# Patient Record
Sex: MALE | Race: WHITE | Employment: FULL TIME | URBAN - NONMETROPOLITAN AREA
[De-identification: names, ages, dates, MRNs, and addresses within clinical notes are randomized per-mention and may not be internally consistent; named-entity substitution may affect disease eponyms.]

---

## 2020-01-18 ENCOUNTER — OFFICE VISIT (OUTPATIENT)
Dept: PRIMARY CARE CLINIC | Age: 30
End: 2020-01-18
Payer: COMMERCIAL

## 2020-01-18 VITALS
BODY MASS INDEX: 31.55 KG/M2 | SYSTOLIC BLOOD PRESSURE: 138 MMHG | TEMPERATURE: 98.2 F | HEART RATE: 88 BPM | WEIGHT: 213 LBS | RESPIRATION RATE: 16 BRPM | OXYGEN SATURATION: 97 % | HEIGHT: 69 IN | DIASTOLIC BLOOD PRESSURE: 88 MMHG

## 2020-01-18 PROCEDURE — G8417 CALC BMI ABV UP PARAM F/U: HCPCS | Performed by: NURSE PRACTITIONER

## 2020-01-18 PROCEDURE — G8484 FLU IMMUNIZE NO ADMIN: HCPCS | Performed by: NURSE PRACTITIONER

## 2020-01-18 PROCEDURE — 1036F TOBACCO NON-USER: CPT | Performed by: NURSE PRACTITIONER

## 2020-01-18 PROCEDURE — 99213 OFFICE O/P EST LOW 20 MIN: CPT | Performed by: NURSE PRACTITIONER

## 2020-01-18 PROCEDURE — G8427 DOCREV CUR MEDS BY ELIG CLIN: HCPCS | Performed by: NURSE PRACTITIONER

## 2020-01-18 RX ORDER — AZITHROMYCIN 250 MG/1
TABLET, FILM COATED ORAL
Qty: 1 PACKET | Refills: 0 | Status: SHIPPED | OUTPATIENT
Start: 2020-01-18 | End: 2020-01-23

## 2020-01-18 RX ORDER — PREDNISONE 20 MG/1
20 TABLET ORAL 2 TIMES DAILY
Qty: 10 TABLET | Refills: 0 | Status: SHIPPED | OUTPATIENT
Start: 2020-01-18 | End: 2020-01-23

## 2020-01-18 RX ORDER — BENZONATATE 100 MG/1
100 CAPSULE ORAL 3 TIMES DAILY PRN
Qty: 30 CAPSULE | Refills: 1 | Status: SHIPPED | OUTPATIENT
Start: 2020-01-18 | End: 2020-09-27

## 2020-01-18 ASSESSMENT — ENCOUNTER SYMPTOMS
VOMITING: 0
WHEEZING: 0
SHORTNESS OF BREATH: 0
NAUSEA: 0
COUGH: 1
SINUS PRESSURE: 1
RHINORRHEA: 1
DIARRHEA: 0
SORE THROAT: 1

## 2020-01-18 NOTE — PROGRESS NOTES
Subjective:      Patient ID: Anne-Marie Hebert is a 30 y.o. male. Cough   This is a new problem. The current episode started in the past 7 days. The problem has been unchanged. The problem occurs every few minutes. The cough is productive of sputum. Associated symptoms include ear pain, nasal congestion, postnasal drip, rhinorrhea and a sore throat. Pertinent negatives include no chest pain, fever, shortness of breath or wheezing. Nothing aggravates the symptoms. Treatments tried: cough drops, nasal spray. The treatment provided mild relief. Past Medical History:   Diagnosis Date    Bilateral knee pain     Corneal abrasion, left     history of     Myofasciitis     bilateral     Patellofemoral dysfunction     bilateral     Pes planus     Plantar fasciitis, bilateral        History reviewed. No pertinent surgical history.     Social History     Socioeconomic History    Marital status: Single     Spouse name: None    Number of children: None    Years of education: None    Highest education level: None   Occupational History    None   Social Needs    Financial resource strain: None    Food insecurity:     Worry: None     Inability: None    Transportation needs:     Medical: None     Non-medical: None   Tobacco Use    Smoking status: Never Smoker    Smokeless tobacco: Never Used   Substance and Sexual Activity    Alcohol use: No    Drug use: No    Sexual activity: None   Lifestyle    Physical activity:     Days per week: None     Minutes per session: None    Stress: None   Relationships    Social connections:     Talks on phone: None     Gets together: None     Attends Denominational service: None     Active member of club or organization: None     Attends meetings of clubs or organizations: None     Relationship status: None    Intimate partner violence:     Fear of current or ex partner: None     Emotionally abused: None     Physically abused: None     Forced sexual activity: None   Other symptoms do not improve or worsen   Return PRN         Melanie Lindsay, APRN - CNP

## 2020-09-27 ENCOUNTER — HOSPITAL ENCOUNTER (OUTPATIENT)
Age: 30
Discharge: HOME | End: 2020-09-29
Payer: COMMERCIAL

## 2020-09-27 ENCOUNTER — HOSPITAL ENCOUNTER (OUTPATIENT)
Dept: GENERAL RADIOLOGY | Age: 30
Discharge: HOME | End: 2020-09-29
Payer: COMMERCIAL

## 2020-09-27 ENCOUNTER — HOSPITAL ENCOUNTER (OUTPATIENT)
Age: 30
Setting detail: SPECIMEN
Discharge: HOME | End: 2020-09-27
Payer: COMMERCIAL

## 2020-09-27 ENCOUNTER — OFFICE VISIT (OUTPATIENT)
Dept: PRIMARY CARE CLINIC | Age: 30
End: 2020-09-27
Payer: COMMERCIAL

## 2020-09-27 VITALS
OXYGEN SATURATION: 97 % | RESPIRATION RATE: 16 BRPM | DIASTOLIC BLOOD PRESSURE: 82 MMHG | WEIGHT: 200 LBS | SYSTOLIC BLOOD PRESSURE: 138 MMHG | TEMPERATURE: 98.4 F | HEART RATE: 77 BPM | HEIGHT: 69 IN | BODY MASS INDEX: 29.62 KG/M2

## 2020-09-27 LAB
ABSOLUTE EOS #: 0.15 K/UL (ref 0–0.44)
ABSOLUTE IMMATURE GRANULOCYTE: 0.03 K/UL (ref 0–0.3)
ABSOLUTE LYMPH #: 0.9 K/UL (ref 1.1–3.7)
ABSOLUTE MONO #: 0.83 K/UL (ref 0.1–1.2)
BASOPHILS # BLD: 0 % (ref 0–2)
BASOPHILS ABSOLUTE: <0.03 K/UL (ref 0–0.2)
DIFFERENTIAL TYPE: ABNORMAL
EOSINOPHILS RELATIVE PERCENT: 2 % (ref 1–4)
HCT VFR BLD CALC: 42.9 % (ref 40.7–50.3)
HEMOGLOBIN: 14.8 G/DL (ref 13–17)
IMMATURE GRANULOCYTES: 0 %
LYMPHOCYTES # BLD: 11 % (ref 24–43)
MCH RBC QN AUTO: 29.4 PG (ref 25.2–33.5)
MCHC RBC AUTO-ENTMCNC: 34.5 G/DL (ref 25.2–33.5)
MCV RBC AUTO: 85.3 FL (ref 82.6–102.9)
MONOCYTES # BLD: 10 % (ref 3–12)
NRBC AUTOMATED: 0 PER 100 WBC
PDW BLD-RTO: 12.8 % (ref 11.8–14.4)
PLATELET # BLD: 261 K/UL (ref 138–453)
PLATELET ESTIMATE: ABNORMAL
PMV BLD AUTO: 8.7 FL (ref 8.1–13.5)
RBC # BLD: 5.03 M/UL (ref 4.21–5.77)
RBC # BLD: ABNORMAL 10*6/UL
SEDIMENTATION RATE, ERYTHROCYTE: 17 MM (ref 0–15)
SEG NEUTROPHILS: 77 % (ref 36–65)
SEGMENTED NEUTROPHILS ABSOLUTE COUNT: 6.53 K/UL (ref 1.5–8.1)
URIC ACID: 7.6 MG/DL (ref 3.4–7)
WBC # BLD: 8.5 K/UL (ref 3.5–11.3)
WBC # BLD: ABNORMAL 10*3/UL

## 2020-09-27 PROCEDURE — 1036F TOBACCO NON-USER: CPT | Performed by: FAMILY MEDICINE

## 2020-09-27 PROCEDURE — 73630 X-RAY EXAM OF FOOT: CPT

## 2020-09-27 PROCEDURE — 86140 C-REACTIVE PROTEIN: CPT

## 2020-09-27 PROCEDURE — 85025 COMPLETE CBC W/AUTO DIFF WBC: CPT

## 2020-09-27 PROCEDURE — 36415 COLL VENOUS BLD VENIPUNCTURE: CPT

## 2020-09-27 PROCEDURE — G8427 DOCREV CUR MEDS BY ELIG CLIN: HCPCS | Performed by: FAMILY MEDICINE

## 2020-09-27 PROCEDURE — 85651 RBC SED RATE NONAUTOMATED: CPT

## 2020-09-27 PROCEDURE — 84550 ASSAY OF BLOOD/URIC ACID: CPT

## 2020-09-27 PROCEDURE — G8417 CALC BMI ABV UP PARAM F/U: HCPCS | Performed by: FAMILY MEDICINE

## 2020-09-27 PROCEDURE — 99214 OFFICE O/P EST MOD 30 MIN: CPT | Performed by: FAMILY MEDICINE

## 2020-09-27 RX ORDER — COLCHICINE 0.6 MG/1
TABLET ORAL
Qty: 3 TABLET | Refills: 3 | Status: SHIPPED | OUTPATIENT
Start: 2020-09-27 | End: 2021-12-18 | Stop reason: ALTCHOICE

## 2020-09-27 RX ORDER — ACETAMINOPHEN 325 MG/1
650 TABLET ORAL EVERY 6 HOURS PRN
COMMUNITY

## 2020-09-27 RX ORDER — IBUPROFEN 200 MG
600 TABLET ORAL EVERY 6 HOURS PRN
COMMUNITY

## 2020-09-27 ASSESSMENT — PATIENT HEALTH QUESTIONNAIRE - PHQ9
1. LITTLE INTEREST OR PLEASURE IN DOING THINGS: 0
2. FEELING DOWN, DEPRESSED OR HOPELESS: 0
SUM OF ALL RESPONSES TO PHQ QUESTIONS 1-9: 0
SUM OF ALL RESPONSES TO PHQ9 QUESTIONS 1 & 2: 0
SUM OF ALL RESPONSES TO PHQ QUESTIONS 1-9: 0

## 2020-09-27 NOTE — PROGRESS NOTES
[facility]             [address] [place] [address] OF [place], [address] [place] [address] Rd                        Telephone [phone]             Fax [phone]       Yuly Stark  1990  MRN:  [id number]  Date of visit:  9/27/2020     Subjective: Yuly Stark is a 26 y.o.  male who presents to [address] [place] [address] Urgent Care today (9/27/2020) for evaluation of:  Foot Swelling (Romeo feet pain/edema up to calf and knees at times x10 days. )      He states that he has had bilateral foot pain for about 10 days. He denies injury. He has been taking Tylenol and Ibuprofen for the pain, which helps enough for him to be able to work. He states that the pain is worse first thing in the morning, or after he has been sitting for a while. He does not take any prescription medications currently. Current medications are:  Outpatient Medications Marked as Taking for the 9/27/20 encounter (Office Visit) with Brook Garcia MD   Medication Sig Dispense Refill    acetaminophen (TYLENOL) 325 MG tablet Take 650 mg by mouth every 6 hours as needed for Pain      ibuprofen (ADVIL;MOTRIN) 200 MG tablet Take 600 mg by mouth every 6 hours as needed for Pain          He has No Known Allergies. He  reports that he has never smoked. He has never used smokeless tobacco.      Objective:    Vitals:    09/27/20 1324   BP: 138/82   Site: Right Upper Arm   Position: Sitting   Cuff Size: Medium Adult   Pulse: 77   Resp: 16   Temp: 98.4 °F (36.9 °C)   TempSrc: Tympanic   SpO2: 97%   Weight: 200 lb (90.7 kg)   Height: 5' 9\" (1.753 m)     Body mass index is 29.53 kg/m². Well-nourished, well-developed overweight  male, in no acute distress. There is mild swelling of the feet bilaterally. He reports pain to palpation in the midfoot bilaterally. There is normal range of motion of the feet and ankles bilaterally.   There is no significant erythema or alignment of the tarsometatarsal joints. No acute joint abnormality. No focal osseous lesion. No focal soft tissue abnormality. Left foot: No evidence of acute fracture. There is normal alignment of the tarsometatarsal joints. No acute joint abnormality. No focal osseous lesion. No focal soft tissue abnormality. Unremarkable examinations of the bilateral feet.      Results of labs done today were reviewed with the patient:  Hospital Outpatient Visit on 09/27/2020   Component Date Value Ref Range Status    Uric Acid 09/27/2020 7.6* 3.4 - 7.0 mg/dL Final    Sed Rate 09/27/2020 17* 0 - 15 mm Final    WBC 09/27/2020 8.5  3.5 - 11.3 k/uL Final    RBC 09/27/2020 5.03  4.21 - 5.77 m/uL Final    Hemoglobin 09/27/2020 14.8  13.0 - 17.0 g/dL Final    Hematocrit 09/27/2020 42.9  40.7 - 50.3 % Final    MCV 09/27/2020 85.3  82.6 - 102.9 fL Final    MCH 09/27/2020 29.4  25.2 - 33.5 pg Final    MCHC 09/27/2020 34.5* 25.2 - 33.5 g/dL Final    RDW 09/27/2020 12.8  11.8 - 14.4 % Final    Platelets 33/59/9414 261  138 - 453 k/uL Final    MPV 09/27/2020 8.7  8.1 - 13.5 fL Final    NRBC Automated 09/27/2020 0.0  0.0 per 100 WBC Final    Differential Type 09/27/2020 NOT REPORTED   Final    Seg Neutrophils 09/27/2020 77* 36 - 65 % Final    Lymphocytes 09/27/2020 11* 24 - 43 % Final    Monocytes 09/27/2020 10  3 - 12 % Final    Eosinophils % 09/27/2020 2  1 - 4 % Final    Basophils 09/27/2020 0  0 - 2 % Final    Immature Granulocytes 09/27/2020 0  0 % Final    Segs Absolute 09/27/2020 6.53  1.50 - 8.10 k/uL Final    Absolute Lymph # 09/27/2020 0.90* 1.10 - 3.70 k/uL Final    Absolute Mono # 09/27/2020 0.83  0.10 - 1.20 k/uL Final    Absolute Eos # 09/27/2020 0.15  0.00 - 0.44 k/uL Final    Basophils Absolute 09/27/2020 <0.03  0.00 - 0.20 k/uL Final    Absolute Immature Granulocyte 09/27/2020 0.03  0.00 - 0.30 k/uL Final    WBC Morphology 09/27/2020 NOT REPORTED   Final    RBC Morphology 09/27/2020 NOT REPORTED   Final    Platelet Estimate 90/63/0424 NOT REPORTED   Final          Assessment and Plan:    Bilateral foot pain  I discussed with the patient that his symptoms, x-ray results, and lab results are consistent with gout. Colchicine was prescribed:  - colchicine (COLCRYS) 0.6 MG tablet; Take 2 po x 1, then take 1 po one hour later. Dispense: 3 tablet; Refill: 3      Printed information regarding Gout was provided to the patient with his after visit summary. Printed information regarding Purine-Restricted Diet was provided to the patient with his after visit summary. He was advised to follow up if symptoms worsen or do not resolve.      (Please note that portions of this note were completed with a voice-recognition program. Efforts were made to edit the dictation but occasionally words are mis-transcribed.)

## 2020-09-28 LAB — C-REACTIVE PROTEIN: 41.2 MG/L (ref 0–5)

## 2021-12-18 ENCOUNTER — OFFICE VISIT (OUTPATIENT)
Dept: PRIMARY CARE CLINIC | Age: 31
End: 2021-12-18
Payer: COMMERCIAL

## 2021-12-18 VITALS
BODY MASS INDEX: 34.07 KG/M2 | DIASTOLIC BLOOD PRESSURE: 80 MMHG | SYSTOLIC BLOOD PRESSURE: 128 MMHG | HEART RATE: 80 BPM | OXYGEN SATURATION: 98 % | WEIGHT: 230 LBS | HEIGHT: 69 IN

## 2021-12-18 DIAGNOSIS — M77.11 LATERAL EPICONDYLITIS OF BOTH ELBOWS: Primary | ICD-10-CM

## 2021-12-18 DIAGNOSIS — M77.12 LATERAL EPICONDYLITIS OF BOTH ELBOWS: Primary | ICD-10-CM

## 2021-12-18 PROCEDURE — 1036F TOBACCO NON-USER: CPT | Performed by: FAMILY MEDICINE

## 2021-12-18 PROCEDURE — G8484 FLU IMMUNIZE NO ADMIN: HCPCS | Performed by: FAMILY MEDICINE

## 2021-12-18 PROCEDURE — G8427 DOCREV CUR MEDS BY ELIG CLIN: HCPCS | Performed by: FAMILY MEDICINE

## 2021-12-18 PROCEDURE — G8417 CALC BMI ABV UP PARAM F/U: HCPCS | Performed by: FAMILY MEDICINE

## 2021-12-18 PROCEDURE — 99213 OFFICE O/P EST LOW 20 MIN: CPT | Performed by: FAMILY MEDICINE

## 2021-12-18 RX ORDER — PREDNISONE 20 MG/1
20 TABLET ORAL 2 TIMES DAILY
Qty: 10 TABLET | Refills: 0 | Status: SHIPPED | OUTPATIENT
Start: 2021-12-18 | End: 2021-12-23

## 2021-12-18 ASSESSMENT — ENCOUNTER SYMPTOMS
WHEEZING: 0
SHORTNESS OF BREATH: 0
COUGH: 0
CHEST TIGHTNESS: 0

## 2021-12-18 NOTE — LETTER
[address] [facility] A department of [place] [address]  Phone: [phone]  Fax: [phone]    Tanya Rosales MD        December 18, 2021     Patient: Luke Macdonald   YOB: 1990   Date of Visit: 12/18/2021       To Whom It May Concern: It is my medical opinion that Kendell Dixon may return to work on 12/27/21. He missed work 12/18-12/27 due to injury. If you have any questions or concerns, please don't hesitate to call.     Sincerely,        Tanya Rosales MD

## 2021-12-22 ENCOUNTER — TELEPHONE (OUTPATIENT)
Dept: FAMILY MEDICINE CLINIC | Age: 31
End: 2021-12-22

## 2024-04-03 ENCOUNTER — OFFICE VISIT (OUTPATIENT)
Dept: PRIMARY CARE CLINIC | Age: 34
End: 2024-04-03
Payer: COMMERCIAL

## 2024-04-03 ENCOUNTER — HOSPITAL ENCOUNTER (OUTPATIENT)
Dept: CT IMAGING | Age: 34
Discharge: HOME | End: 2024-04-05
Payer: COMMERCIAL

## 2024-04-03 VITALS
SYSTOLIC BLOOD PRESSURE: 138 MMHG | HEART RATE: 78 BPM | BODY MASS INDEX: 35.1 KG/M2 | DIASTOLIC BLOOD PRESSURE: 88 MMHG | TEMPERATURE: 97.9 F | RESPIRATION RATE: 16 BRPM | WEIGHT: 237 LBS | OXYGEN SATURATION: 98 % | HEIGHT: 69 IN

## 2024-04-03 DIAGNOSIS — R03.0 ELEVATED BLOOD PRESSURE READING: ICD-10-CM

## 2024-04-03 DIAGNOSIS — J02.9 SORE THROAT: ICD-10-CM

## 2024-04-03 DIAGNOSIS — R13.10 DYSPHAGIA, UNSPECIFIED TYPE: ICD-10-CM

## 2024-04-03 DIAGNOSIS — J02.0 STREP THROAT: Primary | Chronic | ICD-10-CM

## 2024-04-03 DIAGNOSIS — Z87.898 HISTORY OF SNORING: ICD-10-CM

## 2024-04-03 DIAGNOSIS — J35.1 LARGE TONSILS: ICD-10-CM

## 2024-04-03 LAB — S PYO AG THROAT QL: POSITIVE

## 2024-04-03 PROCEDURE — 6360000004 HC RX CONTRAST MEDICATION: Performed by: FAMILY MEDICINE

## 2024-04-03 PROCEDURE — 87880 STREP A ASSAY W/OPTIC: CPT | Performed by: FAMILY MEDICINE

## 2024-04-03 PROCEDURE — 2709999900 CT SOFT TISSUE NECK W CONTRAST

## 2024-04-03 PROCEDURE — 99213 OFFICE O/P EST LOW 20 MIN: CPT | Performed by: FAMILY MEDICINE

## 2024-04-03 RX ORDER — AMOXICILLIN 875 MG/1
875 TABLET, COATED ORAL 2 TIMES DAILY
Qty: 20 TABLET | Refills: 0 | Status: SHIPPED | OUTPATIENT
Start: 2024-04-03 | End: 2024-04-13

## 2024-04-03 RX ORDER — METHYLPREDNISOLONE 4 MG/1
TABLET ORAL
Qty: 1 KIT | Refills: 0 | Status: SHIPPED | OUTPATIENT
Start: 2024-04-03 | End: 2024-04-09

## 2024-04-03 RX ADMIN — IOPAMIDOL 75 ML: 755 INJECTION, SOLUTION INTRAVENOUS at 11:06

## 2024-04-03 SDOH — ECONOMIC STABILITY: HOUSING INSECURITY
IN THE LAST 12 MONTHS, WAS THERE A TIME WHEN YOU DID NOT HAVE A STEADY PLACE TO SLEEP OR SLEPT IN A SHELTER (INCLUDING NOW)?: NO

## 2024-04-03 SDOH — ECONOMIC STABILITY: INCOME INSECURITY: HOW HARD IS IT FOR YOU TO PAY FOR THE VERY BASICS LIKE FOOD, HOUSING, MEDICAL CARE, AND HEATING?: NOT HARD AT ALL

## 2024-04-03 SDOH — ECONOMIC STABILITY: FOOD INSECURITY: WITHIN THE PAST 12 MONTHS, THE FOOD YOU BOUGHT JUST DIDN'T LAST AND YOU DIDN'T HAVE MONEY TO GET MORE.: NEVER TRUE

## 2024-04-03 SDOH — ECONOMIC STABILITY: FOOD INSECURITY: WITHIN THE PAST 12 MONTHS, YOU WORRIED THAT YOUR FOOD WOULD RUN OUT BEFORE YOU GOT MONEY TO BUY MORE.: NEVER TRUE

## 2024-04-03 ASSESSMENT — PATIENT HEALTH QUESTIONNAIRE - PHQ9: DEPRESSION UNABLE TO ASSESS: PT REFUSES

## 2024-04-03 NOTE — PROGRESS NOTES
[place] [address]                        Telephone [phone]             Fax [phone]       Tej Tomlinson  :  1990  Age:  33 y.o.   MRN:  [id number]  Date of visit:  4/3/2024       Assessment & Plan:    1. Strep throat  2. Sore throat  3. Dysphagia, unspecified type  I reviewed the results of testing done today with the patient.  Amoxicillin and a Medrol Dose Hua were prescribed:  - amoxicillin (AMOXIL) 875 MG tablet; Take 1 tablet by mouth 2 times daily for 10 days  Dispense: 20 tablet; Refill: 0  - methylPREDNISolone (MEDROL, HUA,) 4 MG tablet; Take by mouth as directed per instructions on dose pack.  Take with food.  Dispense: 1 kit; Refill: 0    4. History of snoring  5. Large tonsils  He was referred to ENT:  - External Referral To ENT    He was advised to follow up if symptoms worsen or do not resolve.     6. Elevated blood pressure reading  His blood pressure is elevated today.  (BP: 138/88)   I recommended that he schedule an appointment to establish with a PCP.        Subjective:    Tej Tomlinson is a 33 y.o. male who presents to [facility] today (4/3/2024) for evaluation of:  Sore Throat (Pt with sore throat starting last night. )      He reports a sore throat that began yesterday.   He denies fever or chills.  He reports that his throat was swollen last night and he had difficulty drinking fluids, and had some difficulty breathing.  He states that this has improved somewhat today, but he has not attempted to eat or drink anything today.  He has questions about tonsillectomy.   He states that he has frequent sore throats.  He has been told that he snores.      Current medications are:  Current Outpatient Medications   Medication Sig Dispense Refill    ibuprofen (ADVIL;MOTRIN) 200 MG tablet Take 3 tablets by mouth every 6 hours as needed for Pain       No